# Patient Record
Sex: FEMALE | Race: WHITE | NOT HISPANIC OR LATINO | Employment: OTHER | URBAN - METROPOLITAN AREA
[De-identification: names, ages, dates, MRNs, and addresses within clinical notes are randomized per-mention and may not be internally consistent; named-entity substitution may affect disease eponyms.]

---

## 2017-04-08 ENCOUNTER — CONVERSION ENCOUNTER (OUTPATIENT)
Dept: URGENT CARE | Facility: CLINIC | Age: 67
End: 2017-04-08

## 2017-04-17 ENCOUNTER — CONVERSION ENCOUNTER (OUTPATIENT)
Dept: URGENT CARE | Facility: CLINIC | Age: 67
End: 2017-04-17

## 2017-04-27 ENCOUNTER — HOSPITAL ENCOUNTER (OUTPATIENT)
Dept: CT IMAGING | Facility: HOSPITAL | Age: 67
Discharge: HOME | End: 2017-04-27
Attending: INTERNAL MEDICINE | Admitting: INTERNAL MEDICINE

## 2017-04-27 LAB — CREAT BLDA-MCNC: 0.5 MG/DL (ref 0.6–1.3)

## 2019-06-04 VITALS
DIASTOLIC BLOOD PRESSURE: 98 MMHG | OXYGEN SATURATION: 92 % | DIASTOLIC BLOOD PRESSURE: 89 MMHG | WEIGHT: 125.8 LBS | SYSTOLIC BLOOD PRESSURE: 142 MMHG | HEART RATE: 97 BPM | HEIGHT: 64 IN | BODY MASS INDEX: 21.34 KG/M2 | SYSTOLIC BLOOD PRESSURE: 146 MMHG | WEIGHT: 125 LBS | BODY MASS INDEX: 21.59 KG/M2 | RESPIRATION RATE: 28 BRPM | RESPIRATION RATE: 24 BRPM | HEART RATE: 78 BPM | OXYGEN SATURATION: 92 %

## 2021-06-08 ENCOUNTER — APPOINTMENT (OUTPATIENT)
Dept: OTHER | Facility: HOSPITAL | Age: 71
End: 2021-06-08

## 2021-06-08 ENCOUNTER — APPOINTMENT (OUTPATIENT)
Dept: CT IMAGING | Facility: HOSPITAL | Age: 71
End: 2021-06-08

## 2021-06-08 ENCOUNTER — HOSPITAL ENCOUNTER (EMERGENCY)
Facility: HOSPITAL | Age: 71
Discharge: HOME | End: 2021-06-08
Admitting: EMERGENCY MEDICINE

## 2021-06-08 VITALS
SYSTOLIC BLOOD PRESSURE: 145 MMHG | DIASTOLIC BLOOD PRESSURE: 78 MMHG | WEIGHT: 124.78 LBS | RESPIRATION RATE: 18 BRPM | TEMPERATURE: 98 F | BODY MASS INDEX: 21.3 KG/M2 | HEIGHT: 64 IN | OXYGEN SATURATION: 96 % | HEART RATE: 93 BPM

## 2021-06-08 DIAGNOSIS — Z09 FOLLOW UP: ICD-10-CM

## 2021-06-08 DIAGNOSIS — R05.9 COUGH: Primary | ICD-10-CM

## 2021-06-08 DIAGNOSIS — R93.89 ABNORMAL CT SCAN: ICD-10-CM

## 2021-06-08 LAB
ANION GAP SERPL CALCULATED.3IONS-SCNC: 12 MMOL/L (ref 5–15)
B PARAPERT DNA SPEC QL NAA+PROBE: NOT DETECTED
B PERT DNA SPEC QL NAA+PROBE: NOT DETECTED
BASOPHILS # BLD AUTO: 0 10*3/MM3 (ref 0–0.2)
BASOPHILS NFR BLD AUTO: 0.6 % (ref 0–1.5)
BUN SERPL-MCNC: 10 MG/DL (ref 8–23)
BUN/CREAT SERPL: 18.5 (ref 7–25)
C PNEUM DNA NPH QL NAA+NON-PROBE: NOT DETECTED
CALCIUM SPEC-SCNC: 9.8 MG/DL (ref 8.6–10.5)
CHLORIDE SERPL-SCNC: 102 MMOL/L (ref 98–107)
CO2 SERPL-SCNC: 25 MMOL/L (ref 22–29)
CREAT SERPL-MCNC: 0.54 MG/DL (ref 0.57–1)
DEPRECATED RDW RBC AUTO: 45.1 FL (ref 37–54)
EOSINOPHIL # BLD AUTO: 0 10*3/MM3 (ref 0–0.4)
EOSINOPHIL NFR BLD AUTO: 0.5 % (ref 0.3–6.2)
ERYTHROCYTE [DISTWIDTH] IN BLOOD BY AUTOMATED COUNT: 13.9 % (ref 12.3–15.4)
FLUAV SUBTYP SPEC NAA+PROBE: NOT DETECTED
FLUBV RNA ISLT QL NAA+PROBE: NOT DETECTED
GFR SERPL CREATININE-BSD FRML MDRD: 111 ML/MIN/1.73
GLUCOSE SERPL-MCNC: 102 MG/DL (ref 65–99)
HADV DNA SPEC NAA+PROBE: NOT DETECTED
HCOV 229E RNA SPEC QL NAA+PROBE: NOT DETECTED
HCOV HKU1 RNA SPEC QL NAA+PROBE: NOT DETECTED
HCOV NL63 RNA SPEC QL NAA+PROBE: NOT DETECTED
HCOV OC43 RNA SPEC QL NAA+PROBE: NOT DETECTED
HCT VFR BLD AUTO: 42.3 % (ref 34–46.6)
HGB BLD-MCNC: 14 G/DL (ref 12–15.9)
HMPV RNA NPH QL NAA+NON-PROBE: NOT DETECTED
HPIV1 RNA SPEC QL NAA+PROBE: NOT DETECTED
HPIV2 RNA SPEC QL NAA+PROBE: NOT DETECTED
HPIV3 RNA NPH QL NAA+PROBE: NOT DETECTED
HPIV4 P GENE NPH QL NAA+PROBE: NOT DETECTED
LYMPHOCYTES # BLD AUTO: 1.3 10*3/MM3 (ref 0.7–3.1)
LYMPHOCYTES NFR BLD AUTO: 21.2 % (ref 19.6–45.3)
M PNEUMO IGG SER IA-ACNC: NOT DETECTED
MCH RBC QN AUTO: 30.9 PG (ref 26.6–33)
MCHC RBC AUTO-ENTMCNC: 33.2 G/DL (ref 31.5–35.7)
MCV RBC AUTO: 93.3 FL (ref 79–97)
MONOCYTES # BLD AUTO: 0.4 10*3/MM3 (ref 0.1–0.9)
MONOCYTES NFR BLD AUTO: 5.9 % (ref 5–12)
NEUTROPHILS NFR BLD AUTO: 4.4 10*3/MM3 (ref 1.7–7)
NEUTROPHILS NFR BLD AUTO: 71.8 % (ref 42.7–76)
NRBC BLD AUTO-RTO: 0 /100 WBC (ref 0–0.2)
PLATELET # BLD AUTO: 179 10*3/MM3 (ref 140–450)
PMV BLD AUTO: 8.6 FL (ref 6–12)
POTASSIUM SERPL-SCNC: 4.3 MMOL/L (ref 3.5–5.2)
RBC # BLD AUTO: 4.53 10*6/MM3 (ref 3.77–5.28)
RHINOVIRUS RNA SPEC NAA+PROBE: NOT DETECTED
RSV RNA NPH QL NAA+NON-PROBE: NOT DETECTED
SARS-COV-2 RNA NPH QL NAA+NON-PROBE: NOT DETECTED
SODIUM SERPL-SCNC: 139 MMOL/L (ref 136–145)
WBC # BLD AUTO: 6.2 10*3/MM3 (ref 3.4–10.8)

## 2021-06-08 PROCEDURE — 85025 COMPLETE CBC W/AUTO DIFF WBC: CPT | Performed by: NURSE PRACTITIONER

## 2021-06-08 PROCEDURE — 74177 CT ABD & PELVIS W/CONTRAST: CPT

## 2021-06-08 PROCEDURE — 0 IOPAMIDOL PER 1 ML: Performed by: NURSE PRACTITIONER

## 2021-06-08 PROCEDURE — 0202U NFCT DS 22 TRGT SARS-COV-2: CPT | Performed by: NURSE PRACTITIONER

## 2021-06-08 PROCEDURE — 99283 EMERGENCY DEPT VISIT LOW MDM: CPT

## 2021-06-08 PROCEDURE — 71260 CT THORAX DX C+: CPT

## 2021-06-08 PROCEDURE — 80048 BASIC METABOLIC PNL TOTAL CA: CPT | Performed by: NURSE PRACTITIONER

## 2021-06-08 RX ORDER — MELATONIN
1000 DAILY
COMMUNITY

## 2021-06-08 RX ORDER — AMOXICILLIN AND CLAVULANATE POTASSIUM 875; 125 MG/1; MG/1
1 TABLET, FILM COATED ORAL EVERY 12 HOURS
COMMUNITY
Start: 2021-06-07 | End: 2021-06-17

## 2021-06-08 RX ORDER — ALBUTEROL SULFATE 90 UG/1
2 AEROSOL, METERED RESPIRATORY (INHALATION)
COMMUNITY
Start: 2017-04-08

## 2021-06-08 RX ORDER — ASCORBIC ACID 250 MG
250 TABLET ORAL DAILY
COMMUNITY

## 2021-06-08 RX ORDER — LANOLIN ALCOHOL/MO/W.PET/CERES
CREAM (GRAM) TOPICAL
COMMUNITY

## 2021-06-08 RX ORDER — ASPIRIN 81 MG/1
TABLET ORAL
COMMUNITY
Start: 2017-04-08

## 2021-06-08 RX ORDER — SODIUM CHLORIDE 0.9 % (FLUSH) 0.9 %
10 SYRINGE (ML) INJECTION AS NEEDED
Status: DISCONTINUED | OUTPATIENT
Start: 2021-06-08 | End: 2021-06-08 | Stop reason: HOSPADM

## 2021-06-08 RX ADMIN — IOPAMIDOL 100 ML: 755 INJECTION, SOLUTION INTRAVENOUS at 12:24

## 2021-06-08 RX ADMIN — SODIUM CHLORIDE 1000 ML: 9 INJECTION, SOLUTION INTRAVENOUS at 10:39

## 2021-06-08 NOTE — ED PROVIDER NOTES
Subjective   Chief complaint:  Cough congestion abnormal x-ray    Context: Patient is a 71-year-old female who comes in ambuLatory by private vehicle with complaints of cough and congestion with an abnormal x-ray yesterday at urgent care.  has had a productive cough and states she has had some improvement after being seen in urgent care and receiving a prescription for albuterol and Augmentin.   She states she has had symptoms for the last 3 days with some occasional night sweats.  She denies any history of IVDA TB exposure or unexplained weight loss.  She states she was around ill contacts with upper respiratory symptoms last week.  She states she is fully vaccinated against Covid.  She went to urgent care yesterday and was told that she has an abnormal chest x-ray and needed to be evaluated for lung cancer.  She is a longtime smoker.  She states she has never had a colonoscopy.  She states she does get routine mammograms as her mom and sister have both had breast cancer.  She denies any swelling in her legs or feet recent trauma surgery immobilization prior history of DVT or PE.  She denies any chest pain or shortness of breath.   She has a family doctor but does not receive routine cancer screenings.  She states she was instructed to follow-up with her family doctor for outpatient imaging to evaluate for cancer for her suspicious CT states Her doctor was unable to give her an outpatient order for CT and she will be out of town next week so she came to the ER to be evaluated.    Duration: 3 days    Timing: Waxes and wanes     Severity: Minor    Associated symptoms: Better after Augmentin and albuterol          PCP:  red          Review of Systems   Constitutional: Negative for fever.        Night sweats     HENT: Positive for congestion.    Eyes: Negative for visual disturbance.   Respiratory: Positive for cough.    Cardiovascular: Negative.    Gastrointestinal: Negative.    Genitourinary: Negative.     Musculoskeletal: Negative.    Skin: Negative.    Allergic/Immunologic: Negative for immunocompromised state.   Neurological: Negative.    Hematological: Does not bruise/bleed easily.   Psychiatric/Behavioral: Negative for confusion.       Past Medical History:   Diagnosis Date   • Angina at rest (CMS/HCC)        Allergies   Allergen Reactions   • Codeine Hives   • Levofloxacin Myalgia       Past Surgical History:   Procedure Laterality Date   • HERNIA REPAIR         History reviewed. No pertinent family history.    Social History     Socioeconomic History   • Marital status: Significant Other     Spouse name: Not on file   • Number of children: Not on file   • Years of education: Not on file   • Highest education level: Not on file   Tobacco Use   • Smoking status: Current Some Day Smoker   Substance and Sexual Activity   • Alcohol use: Never   • Drug use: Never   • Sexual activity: Defer           Objective   Physical Exam     Vital signs and triage nurse note reviewed.   Constitutional: Awake, alert; well-developed and well-nourished. No acute distress is noted.   HEENT: Normocephalic, atraumatic; pupils are PERRL with intact EOM; oropharynx is pink and moist without exudate or erythema. Nasal congestion without tenderness over the frontal maxillary sinuses  Neck: Supple, full range of motion without pain; no cervical lymphadenopathy.   Cardiovascular: Regular rate and rhythm, normal S1-S2.   Pulmonary: Respiratory effort regular nonlabored, breath sounds diminished bilateral lower lobes   Abdomen: Soft, nontender nondistended with normoactive bowel sounds; no rebound or guarding.   Musculoskeletal: Independent range of motion of all extremities with no palpable tenderness or edema.   Neuro: Alert oriented x3, speech is clear and appropriate, GCS 15   Skin:  Fleshtone warm, dry, intact; no erythematous or petechial rash or lesion       Procedures           ED Course  ED Course as of Jun 08 1505   Tue Jun 08,  2021   1026 Bp 122/71 on initial eval    [JW]   1137 Waiting on pt to go to ct      [JW]   1354 Waiting on CT abdomen to be read    [JW]      ED Course User Index  [JW] Florence Fernando, POOJA                 Labs Reviewed   BASIC METABOLIC PANEL - Abnormal; Notable for the following components:       Result Value    Glucose 102 (*)     Creatinine 0.54 (*)     All other components within normal limits    Narrative:     GFR Normal >60  Chronic Kidney Disease <60  Kidney Failure <15     RESPIRATORY PANEL PCR W/ COVID-19 (SARS-COV-2) NEIL/BECK/SAMUEL/PAD/COR/MAD/[facility] IN-HOUSE, NP SWAB IN UTM/VTP, 3-4 HR TAT - Normal    Narrative:     In the setting of a positive respiratory panel with a viral infection PLUS a negative procalcitonin without other underlying concern for bacterial infection, consider observing off antibiotics or discontinuation of antibiotics and continue supportive care. If the respiratory panel is positive for atypical bacterial infection (Bordetella pertussis, Chlamydophila pneumoniae, or Mycoplasma pneumoniae), consider antibiotic de-escalation to target atypical bacterial infection.   CBC WITH AUTO DIFFERENTIAL - Normal   CBC AND DIFFERENTIAL    Narrative:     The following orders were created for panel order CBC & Differential.  Procedure                               Abnormality         Status                     ---------                               -----------         ------                     CBC Auto Differential[[id number]]        Normal              Final result                 Please view results for these tests on the individual orders.     Medications   sodium chloride 0.9 % flush 10 mL (has no administration in time range)   sodium chloride 0.9 % bolus 1,000 mL (0 mL Intravenous Stopped 6/8/21 1238)   iopamidol (ISOVUE-370) 76 % injection 100 mL (100 mL Intravenous Given 6/8/21 1224)     CT Chest With Contrast Diagnostic    Result Date: 6/8/2021   1. Right hilar mass with contiguous extension  into the mediastinum consistent with malignancy. Bulky adenopathy is seen within the mediastinum as described above. 2. Scattered Tribbett nodular densities in the right lower lobe, right middle lobe and lingula suggesting small airways infectious/inflammatory process. Report 3 no convincing evidence of metastatic disease in the included upper abdomen. Dedicated CT abdomen and pelvis with contrast or PET/CT is recommended for further evaluation or staging purposes. 3. Emphysema.    Electronically Signed By-Dona Suarez MD On:6/8/2021 12:54 PM This report was finalized on [id number] by  Dona Suarez MD.    CT Abdomen Pelvis With Contrast    Result Date: 6/8/2021   1. No acute findings in the abdomen or pelvis. 2. No evidence of primary malignancy or metastatic disease in the abdomen or pelvis. 3. CT chest performed on this same date has been reported separately.    Electronically Signed By-Dona Suarez MD On:6/8/2021 2:57 PM This report was finalized on [id number] by  Dona Suarez MD.    Prior to Admission medications    Medication Sig Start Date End Date Taking? Authorizing Provider   albuterol sulfate  (90 Base) MCG/ACT inhaler Inhale 2 puffs. 4/8/17  Yes Stepan Calderón MD   amoxicillin-clavulanate (AUGMENTIN) 875-125 MG per tablet Take 1 tablet by mouth Every 12 (Twelve) Hours. 6/7/21 6/17/21 Yes Stepan Calderón MD   aspirin (Adult Aspirin EC Low Strength) 81 MG EC tablet ADULT ASPIRIN EC LOW STRENGTH 81 MG ORAL TABLET DELAYED RELEASE 4/8/17  Yes Stepan Calderón MD   ascorbic acid (VITAMIN C) 250 MG tablet Take 250 mg by mouth Daily.    Stepan Calderón MD   Biotin 300 MCG tablet Take  by mouth.    Stepan Calderón MD   cholecalciferol (Cholecalciferol) 25 MCG (1000 UT) tablet Take 1,000 Units by mouth Daily.    Stepan Calderón MD                                MDM  Number of Diagnoses or Management Options  Abnormal CT scan  Cough  Diagnosis management  comments:      Comorbidities:  has a past medical history of Angina at rest (CMS/HCC).  Differentials: Cancer adenopathy viral illness not all inclusive of differentials considered  Radiology interpretation:  X-rays reviewed by me and interpreted by radiologist,   CT Chest With Contrast Diagnostic    Result Date: 6/8/2021   1. Right hilar mass with contiguous extension into the mediastinum consistent with malignancy. Bulky adenopathy is seen within the mediastinum as described above. 2. Scattered Tribbett nodular densities in the right lower lobe, right middle lobe and lingula suggesting small airways infectious/inflammatory process. Report 3 no convincing evidence of metastatic disease in the included upper abdomen. Dedicated CT abdomen and pelvis with contrast or PET/CT is recommended for further evaluation or staging purposes. 3. Emphysema.    Electronically Signed By-Dona Suarez MD On:6/8/2021 12:54 PM This report was finalized on [id number] by  Dona Suarez MD.    CT Abdomen Pelvis With Contrast    Result Date: 6/8/2021   1. No acute findings in the abdomen or pelvis. 2. No evidence of primary malignancy or metastatic disease in the abdomen or pelvis. 3. CT chest performed on this same date has been reported separately.    Electronically Signed By-Dona Suarez MD On:6/8/2021 2:57 PM This report was finalized on [id number] by  Dona Suarez MD.    Lab interpretation:  Labs viewed by me significant for, glucose 102    Appropriate PPE worn during exam.  Patient's blood pressure on initial evaluation was 122/71 and her triage blood pressure was felt to be erroneous  Was offered admission which she refused.  We discussed that her CT looks strongly concerning for malignancy and she verbalized understanding states she will follow-up.  Glucose 102    i discussed findings with patient who voices understanding of discharge instructions, signs and symptoms requiring return to ED; discharged improved and in  stable condition with follow up for re-evaluation.             Final diagnoses:   Cough   Abnormal CT scan       ED Disposition  ED Disposition     ED Disposition Condition Comment    Discharge Stable           Debbie Patterson MD  [address] [address]  [phone]    Schedule an appointment as soon as possible for a visit            Medication List      No changes were made to your prescriptions during this visit.          Florence Fernando, APRN  06/08/21 1502

## 2021-06-08 NOTE — ED NOTES
Seen at [facility] yesterday due to cough. Had CXR done and they found an area right lung. She came here for CT scan     Rosy Turpin RN  06/08/21 1027

## 2021-06-28 ENCOUNTER — PREP FOR SURGERY (OUTPATIENT)
Dept: OTHER | Facility: HOSPITAL | Age: 71
End: 2021-06-28

## 2021-06-28 ENCOUNTER — OFFICE VISIT (OUTPATIENT)
Dept: PULMONOLOGY | Facility: HOSPITAL | Age: 71
End: 2021-06-28

## 2021-06-28 VITALS
WEIGHT: 124 LBS | HEART RATE: 96 BPM | HEIGHT: 64 IN | SYSTOLIC BLOOD PRESSURE: 142 MMHG | RESPIRATION RATE: 13 BRPM | BODY MASS INDEX: 21.17 KG/M2 | TEMPERATURE: 98.4 F | DIASTOLIC BLOOD PRESSURE: 68 MMHG | OXYGEN SATURATION: 94 %

## 2021-06-28 DIAGNOSIS — R91.8 LUNG MASS: Primary | ICD-10-CM

## 2021-06-28 PROCEDURE — G0463 HOSPITAL OUTPT CLINIC VISIT: HCPCS

## 2021-06-28 RX ORDER — ISOSORBIDE MONONITRATE 60 MG/1
60 TABLET, EXTENDED RELEASE ORAL EVERY MORNING
COMMUNITY
Start: 2021-06-17

## 2021-06-28 NOTE — PROGRESS NOTES
PULMONARY/ CRITICAL CARE/ SLEEP MEDICINE OUTPATIENT CONSULT/ FOLLOW UP NOTE        Patient Name:  Liz Perla    :  1950    Medical Record:  [id number]    PRIMARY CARE PHYSICIAN     Debbie Patterson MD    REASON FOR CONSULTATION    Liz Perla is a 71 y.o. female who is referred for consultation for lung mass  REVIEW OF SYSTEMS    Constitutional:  Denies fever or chills   Eyes:  Denies change in visual acuity   HENT:  Denies nasal congestion or sore throat   Respiratory:  Denies cough or shortness of breath   Cardiovascular:  Denies chest pain or edema   GI:  Denies abdominal pain, nausea, vomiting, bloody stools or diarrhea   :  Denies dysuria   Musculoskeletal:  Denies back pain or joint pain   Integument:  Denies rash   Neurologic:  Denies headache, focal weakness or sensory changes   Endocrine:  Denies polyuria or polydipsia   Lymphatic:  Denies swollen glands   Psychiatric:  Denies depression or anxiety     MEDICAL HISTORY    Past Medical History:   Diagnosis Date   • Hilar mass 2020    Right   • HTN (hypertension) 2016        SURGICAL HISTORY    Past Surgical History:   Procedure Laterality Date   • HERNIA REPAIR     • KNEE CARTILAGE SURGERY  2016        FAMILY HISTORY    Family History   Problem Relation Age of Onset   • Allergies Mother    • Asthma Father    • Allergies Sister        SOCIAL HISTORY    Social History     Tobacco Use   • Smoking status: Former Smoker     Types: Cigarettes     Quit date: 2019     Years since quittin.4   • Smokeless tobacco: Never Used   Substance Use Topics   • Alcohol use: Never        ALLERGIES    Allergies   Allergen Reactions   • Cinnamon Anaphylaxis   • Codeine Hives   • Garlic Anaphylaxis   • Levofloxacin Myalgia   • Pistachio Nut Anaphylaxis   • Lidocaine Nausea And Vomiting         MEDICATIONS    Current Outpatient Medications on File Prior to Visit   Medication Sig Dispense Refill   • albuterol sulfate  (90 Base) MCG/ACT inhaler  "Inhale 2 puffs.     • ascorbic acid (VITAMIN C) 250 MG tablet Take 250 mg by mouth Daily.     • Biotin 300 MCG tablet Take  by mouth.     • cholecalciferol (Cholecalciferol) 25 MCG (1000 UT) tablet Take 1,000 Units by mouth Daily.     • isosorbide mononitrate (IMDUR) 60 MG 24 hr tablet Take 60 mg by mouth Every Morning.     • aspirin (Adult Aspirin EC Low Strength) 81 MG EC tablet ADULT ASPIRIN EC LOW STRENGTH 81 MG ORAL TABLET DELAYED RELEASE       No current facility-administered medications on file prior to visit.       PHYSICAL EXAM    Vitals:    06/28/21 1442   BP: 142/68   BP Location: Left arm   Patient Position: Sitting   Cuff Size: Adult   Pulse: 96   Resp: 13   Temp: 98.4 °F (36.9 °C)   TempSrc: Oral   SpO2: 94%   Weight: 56.2 kg (124 lb)   Height: 162.6 cm (64\")        Constitutional:  Well developed, well nourished, no acute distress, non-toxic appearance   Eyes:  PERRL, conjunctiva normal   HENT:  Atraumatic, external ears normal, nose normal, oropharynx moist, no pharyngeal exudates. mallampatti   Neck- normal range of motion, no tenderness, supple   Respiratory:  No respiratory distress, normal breath sounds, no rales, no wheezing   Cardiovascular:  Normal rate, normal rhythm, no murmurs, no gallops, no rubs   GI:  Soft, nondistended, normal bowel sounds, nontender, no organomegaly, no mass, no rebound, no guarding   :  No costovertebral angle tenderness   Musculoskeletal:  No edema, no tenderness, no deformities. Back- no tenderness  Integument:  Well hydrated, no rash   Lymphatic:  No lymphadenopathy noted   Neurologic:  Alert & oriented x 3, CN 2-12 normal, normal motor function, normal sensory function, no focal deficits noted   Psychiatric:  Speech and behavior appropriate     CT Chest With Contrast Diagnostic    Result Date: 6/8/2021   1. Right hilar mass with contiguous extension into the mediastinum consistent with malignancy. Bulky adenopathy is seen within the mediastinum as described " above. 2. Scattered Tribbett nodular densities in the right lower lobe, right middle lobe and lingula suggesting small airways infectious/inflammatory process. Report 3 no convincing evidence of metastatic disease in the included upper abdomen. Dedicated CT abdomen and pelvis with contrast or PET/CT is recommended for further evaluation or staging purposes. 3. Emphysema.    Electronically Signed By-Dona Suarez MD On:6/8/2021 12:54 PM This report was finalized on [id number] by  Dona Suarez MD.    CT Abdomen Pelvis With Contrast    Result Date: 6/8/2021   1. No acute findings in the abdomen or pelvis. 2. No evidence of primary malignancy or metastatic disease in the abdomen or pelvis. 3. CT chest performed on this same date has been reported separately.    Electronically Signed By-Dona Suarez MD On:6/8/2021 2:57 PM This report was finalized on [id number] by  Dona Suarez MD.         ASSESSMENT & PLAN:        71-year-old female quit smoking recently with history of 40 to 50 pack years smoking PET scan done on 6/18/2021   right hilar mass on image 54 measuring approximately 3.2 x 2.4 cm with SUV max 10.1, suggesting primary lung malignancy. Scattered tiny tree-in-bud nodules within the right middle, right lower, and left upper lobe lingular regions likely represents infectious or inflammatory changes. There is FDG avid mediastinal lymphadenopathy compatible with metastasis. An index pretracheal lymph node on image 47 measures 3.3 x 2.7 cm with SUV max 10.3.      Plan  EBUS bronchoscopy on 7/2/2021            This document has been electronically signed by  Wellington Branham MD  15:09 EDT

## 2021-06-30 ENCOUNTER — LAB (OUTPATIENT)
Dept: LAB | Facility: HOSPITAL | Age: 71
End: 2021-06-30

## 2021-06-30 ENCOUNTER — HOSPITAL ENCOUNTER (OUTPATIENT)
Dept: CARDIOLOGY | Facility: HOSPITAL | Age: 71
Discharge: HOME | End: 2021-06-30

## 2021-06-30 LAB
ANION GAP SERPL CALCULATED.3IONS-SCNC: 8.6 MMOL/L (ref 5–15)
BUN SERPL-MCNC: 7 MG/DL (ref 8–23)
BUN/CREAT SERPL: 12.5 (ref 7–25)
CALCIUM SPEC-SCNC: 8.8 MG/DL (ref 8.6–10.5)
CHLORIDE SERPL-SCNC: 104 MMOL/L (ref 98–107)
CO2 SERPL-SCNC: 27.4 MMOL/L (ref 22–29)
CREAT SERPL-MCNC: 0.56 MG/DL (ref 0.57–1)
DEPRECATED RDW RBC AUTO: 43.7 FL (ref 37–54)
ERYTHROCYTE [DISTWIDTH] IN BLOOD BY AUTOMATED COUNT: 12.5 % (ref 12.3–15.4)
GFR SERPL CREATININE-BSD FRML MDRD: 107 ML/MIN/1.73
GLUCOSE SERPL-MCNC: 94 MG/DL (ref 65–99)
HCT VFR BLD AUTO: 42.9 % (ref 34–46.6)
HGB BLD-MCNC: 14 G/DL (ref 12–15.9)
MCH RBC QN AUTO: 30.7 PG (ref 26.6–33)
MCHC RBC AUTO-ENTMCNC: 32.6 G/DL (ref 31.5–35.7)
MCV RBC AUTO: 94.1 FL (ref 79–97)
PLATELET # BLD AUTO: 213 10*3/MM3 (ref 140–450)
PMV BLD AUTO: 11.1 FL (ref 6–12)
POTASSIUM SERPL-SCNC: 4.2 MMOL/L (ref 3.5–5.2)
RBC # BLD AUTO: 4.56 10*6/MM3 (ref 3.77–5.28)
SARS-COV-2 ORF1AB RESP QL NAA+PROBE: NOT DETECTED
SODIUM SERPL-SCNC: 140 MMOL/L (ref 136–145)
WBC # BLD AUTO: 5 10*3/MM3 (ref 3.4–10.8)

## 2021-06-30 PROCEDURE — U0005 INFEC AGEN DETEC AMPLI PROBE: HCPCS

## 2021-06-30 PROCEDURE — U0004 COV-19 TEST NON-CDC HGH THRU: HCPCS

## 2021-06-30 PROCEDURE — 36415 COLL VENOUS BLD VENIPUNCTURE: CPT | Performed by: INTERNAL MEDICINE

## 2021-06-30 PROCEDURE — C9803 HOPD COVID-19 SPEC COLLECT: HCPCS

## 2021-06-30 PROCEDURE — 93010 ELECTROCARDIOGRAM REPORT: CPT | Performed by: INTERNAL MEDICINE

## 2021-06-30 PROCEDURE — 80048 BASIC METABOLIC PNL TOTAL CA: CPT | Performed by: INTERNAL MEDICINE

## 2021-06-30 PROCEDURE — 93005 ELECTROCARDIOGRAM TRACING: CPT

## 2021-06-30 PROCEDURE — 85027 COMPLETE CBC AUTOMATED: CPT | Performed by: INTERNAL MEDICINE

## 2021-07-02 ENCOUNTER — ANESTHESIA EVENT (OUTPATIENT)
Dept: GASTROENTEROLOGY | Facility: HOSPITAL | Age: 71
End: 2021-07-02

## 2021-07-02 ENCOUNTER — HOSPITAL ENCOUNTER (OUTPATIENT)
Facility: HOSPITAL | Age: 71
Setting detail: HOSPITAL OUTPATIENT SURGERY
Discharge: HOME | End: 2021-07-02
Attending: INTERNAL MEDICINE | Admitting: INTERNAL MEDICINE

## 2021-07-02 ENCOUNTER — ANESTHESIA (OUTPATIENT)
Dept: GASTROENTEROLOGY | Facility: HOSPITAL | Age: 71
End: 2021-07-02

## 2021-07-02 VITALS
HEART RATE: 111 BPM | OXYGEN SATURATION: 90 % | SYSTOLIC BLOOD PRESSURE: 111 MMHG | DIASTOLIC BLOOD PRESSURE: 62 MMHG | BODY MASS INDEX: 21.17 KG/M2 | WEIGHT: 124 LBS | RESPIRATION RATE: 17 BRPM | HEIGHT: 64 IN | TEMPERATURE: 98 F

## 2021-07-02 DIAGNOSIS — R91.8 LUNG MASS: ICD-10-CM

## 2021-07-02 LAB
B PARAPERT DNA SPEC QL NAA+PROBE: NOT DETECTED
B PERT DNA SPEC QL NAA+PROBE: NOT DETECTED
C PNEUM DNA NPH QL NAA+NON-PROBE: NOT DETECTED
FLUAV SUBTYP SPEC NAA+PROBE: NOT DETECTED
FLUBV RNA ISLT QL NAA+PROBE: NOT DETECTED
HADV DNA SPEC NAA+PROBE: NOT DETECTED
HCOV 229E RNA SPEC QL NAA+PROBE: NOT DETECTED
HCOV HKU1 RNA SPEC QL NAA+PROBE: NOT DETECTED
HCOV NL63 RNA SPEC QL NAA+PROBE: NOT DETECTED
HCOV OC43 RNA SPEC QL NAA+PROBE: NOT DETECTED
HMPV RNA NPH QL NAA+NON-PROBE: NOT DETECTED
HPIV1 RNA SPEC QL NAA+PROBE: NOT DETECTED
HPIV2 RNA SPEC QL NAA+PROBE: NOT DETECTED
HPIV3 RNA NPH QL NAA+PROBE: NOT DETECTED
HPIV4 P GENE NPH QL NAA+PROBE: NOT DETECTED
M PNEUMO IGG SER IA-ACNC: NOT DETECTED
RHINOVIRUS RNA SPEC NAA+PROBE: NOT DETECTED
RSV RNA NPH QL NAA+NON-PROBE: NOT DETECTED

## 2021-07-02 PROCEDURE — 87102 FUNGUS ISOLATION CULTURE: CPT | Performed by: INTERNAL MEDICINE

## 2021-07-02 PROCEDURE — 25010000002 DEXAMETHASONE PER 1 MG: Performed by: ANESTHESIOLOGY

## 2021-07-02 PROCEDURE — 87252 VIRUS INOCULATION TISSUE: CPT | Performed by: INTERNAL MEDICINE

## 2021-07-02 PROCEDURE — 87071 CULTURE AEROBIC QUANT OTHER: CPT | Performed by: INTERNAL MEDICINE

## 2021-07-02 PROCEDURE — 88172 CYTP DX EVAL FNA 1ST EA SITE: CPT | Performed by: INTERNAL MEDICINE

## 2021-07-02 PROCEDURE — 25010000002 PROPOFOL 200 MG/20ML EMULSION: Performed by: ANESTHESIOLOGY

## 2021-07-02 PROCEDURE — 94640 AIRWAY INHALATION TREATMENT: CPT

## 2021-07-02 PROCEDURE — 94799 UNLISTED PULMONARY SVC/PX: CPT

## 2021-07-02 PROCEDURE — 87116 MYCOBACTERIA CULTURE: CPT | Performed by: INTERNAL MEDICINE

## 2021-07-02 PROCEDURE — 87633 RESP VIRUS 12-25 TARGETS: CPT | Performed by: INTERNAL MEDICINE

## 2021-07-02 PROCEDURE — 87206 SMEAR FLUORESCENT/ACID STAI: CPT | Performed by: INTERNAL MEDICINE

## 2021-07-02 PROCEDURE — 87496 CYTOMEG DNA AMP PROBE: CPT | Performed by: INTERNAL MEDICINE

## 2021-07-02 PROCEDURE — 88108 CYTOPATH CONCENTRATE TECH: CPT | Performed by: INTERNAL MEDICINE

## 2021-07-02 PROCEDURE — 25010000002 ONDANSETRON PER 1 MG: Performed by: ANESTHESIOLOGY

## 2021-07-02 PROCEDURE — 25010000002 FENTANYL CITRATE (PF) 50 MCG/ML SOLUTION: Performed by: ANESTHESIOLOGY

## 2021-07-02 PROCEDURE — 88177 CYTP FNA EVAL EA ADDL: CPT | Performed by: INTERNAL MEDICINE

## 2021-07-02 PROCEDURE — 87205 SMEAR GRAM STAIN: CPT | Performed by: INTERNAL MEDICINE

## 2021-07-02 PROCEDURE — 88341 IMHCHEM/IMCYTCHM EA ADD ANTB: CPT | Performed by: INTERNAL MEDICINE

## 2021-07-02 PROCEDURE — C1726 CATH, BAL DIL, NON-VASCULAR: HCPCS | Performed by: INTERNAL MEDICINE

## 2021-07-02 PROCEDURE — 87798 DETECT AGENT NOS DNA AMP: CPT | Performed by: INTERNAL MEDICINE

## 2021-07-02 PROCEDURE — 88305 TISSUE EXAM BY PATHOLOGIST: CPT | Performed by: INTERNAL MEDICINE

## 2021-07-02 PROCEDURE — 88342 IMHCHEM/IMCYTCHM 1ST ANTB: CPT | Performed by: INTERNAL MEDICINE

## 2021-07-02 RX ORDER — MAGNESIUM HYDROXIDE 1200 MG/15ML
LIQUID ORAL
Status: DISCONTINUED
Start: 2021-07-02 | End: 2021-07-02 | Stop reason: HOSPADM

## 2021-07-02 RX ORDER — SODIUM CHLORIDE 9 MG/ML
INJECTION, SOLUTION INTRAVENOUS CONTINUOUS PRN
Status: DISCONTINUED | OUTPATIENT
Start: 2021-07-02 | End: 2021-07-02 | Stop reason: SURG

## 2021-07-02 RX ORDER — PHENYLEPHRINE HCL IN 0.9% NACL 1 MG/10 ML
SYRINGE (ML) INTRAVENOUS AS NEEDED
Status: DISCONTINUED | OUTPATIENT
Start: 2021-07-02 | End: 2021-07-02 | Stop reason: SURG

## 2021-07-02 RX ORDER — MEPERIDINE HYDROCHLORIDE 25 MG/ML
12.5 INJECTION INTRAMUSCULAR; INTRAVENOUS; SUBCUTANEOUS
Status: DISCONTINUED | OUTPATIENT
Start: 2021-07-02 | End: 2021-07-02 | Stop reason: HOSPADM

## 2021-07-02 RX ORDER — LIDOCAINE 50 MG/G
OINTMENT TOPICAL AS NEEDED
Status: DISCONTINUED | OUTPATIENT
Start: 2021-07-02 | End: 2021-07-02 | Stop reason: HOSPADM

## 2021-07-02 RX ORDER — PROPOFOL 10 MG/ML
INJECTION, EMULSION INTRAVENOUS AS NEEDED
Status: DISCONTINUED | OUTPATIENT
Start: 2021-07-02 | End: 2021-07-02 | Stop reason: SURG

## 2021-07-02 RX ORDER — DEXAMETHASONE SODIUM PHOSPHATE 4 MG/ML
8 INJECTION, SOLUTION INTRA-ARTICULAR; INTRALESIONAL; INTRAMUSCULAR; INTRAVENOUS; SOFT TISSUE ONCE AS NEEDED
Status: COMPLETED | OUTPATIENT
Start: 2021-07-02 | End: 2021-07-02

## 2021-07-02 RX ORDER — EPHEDRINE SULFATE 50 MG/ML
INJECTION INTRAVENOUS AS NEEDED
Status: DISCONTINUED | OUTPATIENT
Start: 2021-07-02 | End: 2021-07-02 | Stop reason: SURG

## 2021-07-02 RX ORDER — IPRATROPIUM BROMIDE AND ALBUTEROL SULFATE 2.5; .5 MG/3ML; MG/3ML
3 SOLUTION RESPIRATORY (INHALATION) ONCE AS NEEDED
Status: COMPLETED | OUTPATIENT
Start: 2021-07-02 | End: 2021-07-02

## 2021-07-02 RX ORDER — EPINEPHRINE 0.1 MG/ML
SYRINGE (ML) INJECTION
Status: DISCONTINUED
Start: 2021-07-02 | End: 2021-07-02 | Stop reason: WASHOUT

## 2021-07-02 RX ORDER — ROCURONIUM BROMIDE 10 MG/ML
INJECTION, SOLUTION INTRAVENOUS AS NEEDED
Status: DISCONTINUED | OUTPATIENT
Start: 2021-07-02 | End: 2021-07-02 | Stop reason: SURG

## 2021-07-02 RX ORDER — ONDANSETRON 2 MG/ML
4 INJECTION INTRAMUSCULAR; INTRAVENOUS ONCE AS NEEDED
Status: COMPLETED | OUTPATIENT
Start: 2021-07-02 | End: 2021-07-02

## 2021-07-02 RX ORDER — LIDOCAINE 50 MG/G
OINTMENT TOPICAL
Status: DISCONTINUED
Start: 2021-07-02 | End: 2021-07-02 | Stop reason: HOSPADM

## 2021-07-02 RX ORDER — FENTANYL CITRATE 50 UG/ML
50 INJECTION, SOLUTION INTRAMUSCULAR; INTRAVENOUS
Status: DISCONTINUED | OUTPATIENT
Start: 2021-07-02 | End: 2021-07-02 | Stop reason: HOSPADM

## 2021-07-02 RX ADMIN — PROPOFOL 100 MG: 10 INJECTION, EMULSION INTRAVENOUS at 11:39

## 2021-07-02 RX ADMIN — ONDANSETRON 4 MG: 2 INJECTION INTRAMUSCULAR; INTRAVENOUS at 12:06

## 2021-07-02 RX ADMIN — SUGAMMADEX 200 MG: 100 INJECTION, SOLUTION INTRAVENOUS at 12:18

## 2021-07-02 RX ADMIN — EPHEDRINE SULFATE 20 MG: 50 INJECTION INTRAVENOUS at 11:59

## 2021-07-02 RX ADMIN — ROCURONIUM BROMIDE 30 MG: 10 INJECTION INTRAVENOUS at 11:42

## 2021-07-02 RX ADMIN — FENTANYL CITRATE 50 MCG: 50 INJECTION, SOLUTION INTRAMUSCULAR; INTRAVENOUS at 11:36

## 2021-07-02 RX ADMIN — DEXAMETHASONE SODIUM PHOSPHATE 4 MG: 4 INJECTION, SOLUTION INTRAMUSCULAR; INTRAVENOUS at 12:06

## 2021-07-02 RX ADMIN — Medication 200 MCG: at 11:59

## 2021-07-02 RX ADMIN — SODIUM CHLORIDE: 0.9 INJECTION, SOLUTION INTRAVENOUS at 11:34

## 2021-07-02 RX ADMIN — PROPOFOL 50 MG: 10 INJECTION, EMULSION INTRAVENOUS at 11:47

## 2021-07-02 RX ADMIN — EPHEDRINE SULFATE 10 MG: 50 INJECTION INTRAVENOUS at 11:56

## 2021-07-02 RX ADMIN — Medication 100 MCG: at 11:48

## 2021-07-02 RX ADMIN — FENTANYL CITRATE 50 MCG: 50 INJECTION, SOLUTION INTRAMUSCULAR; INTRAVENOUS at 11:44

## 2021-07-02 RX ADMIN — IPRATROPIUM BROMIDE AND ALBUTEROL SULFATE 3 ML: 2.5; .5 SOLUTION RESPIRATORY (INHALATION) at 12:53

## 2021-07-02 NOTE — H&P
Patient Care Team:  Debbie Patterson MD as PCP - General    Chief complaint mediastinal adenopathy        Assessment/Plan     71-year-old female quit smoking recently with history of 40 to 50 pack years smoking PET scan done on 6/18/2021   right hilar mass on image 54 measuring approximately 3.2 x 2.4 cm with SUV max 10.1, suggesting primary lung malignancy. Scattered tiny tree-in-bud nodules within the right middle, right lower, and left upper lobe lingular regions likely represents infectious or inflammatory changes. There is FDG avid mediastinal lymphadenopathy compatible with metastasis. An index pretracheal lymph node on image 47 measures 3.3 x 2.7 cm with SUV max 10.3.        Plan  EBUS bronchoscopy on 7/2/2021                 History  71-year-old female quit smoking recently with history of 40 to 50 pack years smoking PET scan done on 6/18/2021   right hilar mass on image 54 measuring approximately 3.2 x 2.4 cm with SUV max 10.1, suggesting primary lung malignancy. Scattered tiny tree-in-bud nodules within the right middle, right lower, and left upper lobe lingular regions likely represents infectious or inflammatory changes. There is FDG avid mediastinal lymphadenopathy compatible with metastasis. An index pretracheal lymph node on image 47 measures 3.3 x 2.7 cm with SUV max 10.3.      Lung mass      Past Medical History:   Diagnosis Date   • Angina at rest (CMS/HCC)    • Hilar mass 02/11/2020    Right   • History of squamous cell carcinoma 2016    back of neck, side of face, removed with clean margins   • HTN (hypertension) 2016       Past Surgical History:   Procedure Laterality Date   • CATARACT EXTRACTION EXTRACAPSULAR W/ INTRAOCULAR LENS IMPLANTATION     • HERNIA REPAIR     • KNEE CARTILAGE SURGERY  2016       Family History   Problem Relation Age of Onset   • Allergies Mother    • Asthma Father    • Allergies Sister        Social History     Socioeconomic History   • Marital status: Significant Other      Spouse name: Not on file   • Number of children: Not on file   • Years of education: Not on file   • Highest education level: Not on file   Tobacco Use   • Smoking status: Former Smoker     Types: Cigarettes     Quit date: 2019     Years since quittin.5   • Smokeless tobacco: Never Used   Vaping Use   • Vaping Use: Never used   Substance and Sexual Activity   • Alcohol use: Never   • Drug use: Never   • Sexual activity: Defer       Review of Systems  Review of Systems   Respiratory: Positive for cough and shortness of breath.         Vital Signs  Temp:  [98 °F (36.7 °C)] 98 °F (36.7 °C)  Heart Rate:  [] 111  Resp:  [14-32] 17  BP: (111-137)/(62-80) 111/62    Physical Exam:  Physical Exam  Pulmonary:      Breath sounds: Rales present.           Radiology  Imaging Results (Last 24 Hours)     ** No results found for the last 24 hours. **          Labs:  Results from last 7 days   Lab Units 21  0856   WBC 10*3/mm3 5.00   HEMOGLOBIN g/dL 14.0   HEMATOCRIT % 42.9   PLATELETS 10*3/mm3 213     Results from last 7 days   Lab Units 21  0856   SODIUM mmol/L 140   POTASSIUM mmol/L 4.2   CHLORIDE mmol/L 104   CO2 mmol/L 27.4   BUN mg/dL 7*   CREATININE mg/dL 0.56*   CALCIUM mg/dL 8.8   GLUCOSE mg/dL 94                                       Meds:   SCHEDULE    Infusions  No current facility-administered medications for this encounter.    PRNs        I discussed the patients findings and my recommendations with patient.     Wellington Branham MD  21  16:55 EDT    Time: Critical care 70 min

## 2021-07-02 NOTE — ANESTHESIA POSTPROCEDURE EVALUATION
Patient: Liz Perla    Procedure Summary     Date: 07/02/21 Room / Location: [facility] ENDOSCOPY 2 / [facility] ENDOSCOPY    Anesthesia Start: 1134 Anesthesia Stop: 1223    Procedure: BRONCHOSCOPY WITH BRONCHOAVEOLAR LAVAGE ENDOBRONCHIAL ULTRASOUND with fine needle aspiration x2 areas (N/A Bronchus) Diagnosis:       Lung mass      (Lung mass [R91.8])    Surgeons: Wellington Branham MD Provider: Cosme Diaz MD    Anesthesia Type: general ASA Status: 3          Anesthesia Type: general    Vitals  Vitals Value Taken Time   BP 71/37 07/02/21 1246   Temp     Pulse 117 07/02/21 1249   Resp 22 07/02/21 1230   SpO2 95 % 07/02/21 1249   Vitals shown include unvalidated device data.        Post Anesthesia Care and Evaluation    Patient location during evaluation: PACU  Patient participation: complete - patient participated  Level of consciousness: awake  Pain scale: See nurse's notes for pain score.  Pain management: adequate  Airway patency: patent  Anesthetic complications: No anesthetic complications  PONV Status: none  Cardiovascular status: acceptable  Respiratory status: acceptable  Hydration status: acceptable    Comments: Patient seen and examined postoperatively; vital signs stable; SpO2 greater than or equal to 90%; cardiopulmonary status stable; nausea/vomiting adequately controlled; pain adequately controlled; no apparent anesthesia complications; patient discharged from anesthesia care when discharge criteria were met

## 2021-07-02 NOTE — NURSING NOTE
Patient oxygen saturation was low post procedure, Dr. Branham wanted to let the patient rest and return to baseline. This took longer than expected and caused a delay in PACU. Patient returned to baseline vitals and was okay to discharge per Dr. Branham

## 2021-07-02 NOTE — OP NOTE
Bronchoscopy Procedure Note    Procedure:  1. EBUS bronchoscopy, Diagnostic  2. Bronchoalveolar lavage, BAL right upper lobe  3. Ultrasound-guided Myrick Needle biopsy pretracheal node  4. Ultrasound-guided needle aspiration right hilar node    Pre-Operative Diagnosis: Mediastinal adenopathy    Post-Operative Diagnosis:   EBUS ultrasound-guided fine-needle aspiration from pretracheal node was positive for small cell cancer as well as right hilar needle aspirate    Anesthesia: General Anesthesia    Procedure Details: Patient was consented for the procedure with all risk and benefit of the procedure explained in detail.  Patient was given the opportunity to ask questions and all concerns were answered.  The bronchocope was inserted into the main airway via the endotracheal tube. An anatomical survey was done of the main airways and the subsegmental bronchus to at least the first subsegmental level of all five lobes of both lungs.  The findings are reported below.  A bronchoalveolar lavage was performed using aliquots of normal saline instilled into the airways then aspirated back.    Findings:  Bronchoscope passed into oral cavity to the level of the vocal cords.  Lidocaine used for local anesthetic over vocal cords.  Bronchoscope was passed between the vocal cords into the trachea.  All airways were visualized to at least the first subsegment level of all 5 lobes of both lungs.  Airways were of normal size and caliber.  No endobronchial lesions seen.      EBUS bronchoscopy  We located the pretracheal node and 3 passes where obtained all positive for small cell  We located right hilar node 10R under direct ultrasound guidance 3 passes were done utilizing size 19 gauge needle passes positive for small cell no bleeding was noted    Patient tolerated procedure well        Estimated Blood Loss:  Minimal           Specimens:  Sent serosanguinous fluid                Complications:  None; patient tolerated the procedure  well.           Disposition: PACU - hemodynamically stable.      Patient tolerated the procedure well.    Wellington Branham MD  7/2/2021  16:57 EDT

## 2021-07-02 NOTE — DISCHARGE INSTRUCTIONS
A responsible adult should stay with you and you should rest quietly for the rest of the day.    Do not drink alcohol, drive, operate any heavy machinery or power tools or make any legal/important decisions for the next 24 hours.     Progress your diet as tolerated.  If you begin to experience severe pain, increased shortness of breath, racing heartbeat or a fever above 101 F, seek immediate medical attention.     Follow up with MD as instructed. Call office for results in 3 to 5 days if needed.

## 2021-07-02 NOTE — ANESTHESIA PREPROCEDURE EVALUATION
Anesthesia Evaluation     NPO Solid Status: > 8 hours  NPO Liquid Status: > 8 hours           Airway   Dental      Pulmonary    Cardiovascular     (+) hypertension,       Neuro/Psych  GI/Hepatic/Renal/Endo      Musculoskeletal     Abdominal    Substance History      OB/GYN          Other        ROS/Med Hx Other: Hilar mass                  Anesthesia Plan    ASA 3     general     intravenous induction     Anesthetic plan, all risks, benefits, and alternatives have been provided, discussed and informed consent has been obtained with: patient.

## 2021-07-04 LAB
BACTERIA SPEC AEROBE CULT: NORMAL
GRAM STN SPEC: NORMAL
QT INTERVAL: 383 MS

## 2021-07-06 LAB
LAB AP CASE REPORT: NORMAL
LAB AP CASE REPORT: NORMAL
LAB AP DIAGNOSIS COMMENT: NORMAL
LAB AP DIAGNOSIS COMMENT: NORMAL
Lab: NORMAL
PATH REPORT.FINAL DX SPEC: NORMAL
PATH REPORT.FINAL DX SPEC: NORMAL
PATH REPORT.GROSS SPEC: NORMAL
PATH REPORT.GROSS SPEC: NORMAL

## 2021-07-08 LAB — SPECIMEN STATUS: NORMAL

## 2021-07-09 LAB
CMV DNA SPEC QL NAA+PROBE: NEGATIVE
P JIROVECII DNA # SPEC NAA+PROBE: NEGATIVE {COPIES}/ML
SPECIMEN SOURCE: NORMAL
SPECIMEN SOURCE: NORMAL

## 2021-07-12 LAB — VIRUS SPEC CULT: NORMAL

## 2021-07-13 ENCOUNTER — OFFICE VISIT (OUTPATIENT)
Dept: PULMONOLOGY | Facility: HOSPITAL | Age: 71
End: 2021-07-13

## 2021-07-13 VITALS
DIASTOLIC BLOOD PRESSURE: 72 MMHG | WEIGHT: 125.4 LBS | BODY MASS INDEX: 21.41 KG/M2 | RESPIRATION RATE: 15 BRPM | SYSTOLIC BLOOD PRESSURE: 115 MMHG | TEMPERATURE: 98.1 F | OXYGEN SATURATION: 93 % | HEART RATE: 77 BPM | HEIGHT: 64 IN

## 2021-07-13 DIAGNOSIS — C34.90 SMALL CELL LUNG CANCER (HCC): Primary | ICD-10-CM

## 2021-07-13 DIAGNOSIS — J43.8 OTHER EMPHYSEMA (HCC): ICD-10-CM

## 2021-07-13 PROCEDURE — G0463 HOSPITAL OUTPT CLINIC VISIT: HCPCS

## 2021-07-13 RX ORDER — PROCHLORPERAZINE MALEATE 10 MG
10 TABLET ORAL EVERY 6 HOURS PRN
COMMUNITY
Start: 2021-07-07

## 2021-07-13 RX ORDER — DEXAMETHASONE 4 MG/1
TABLET ORAL
COMMUNITY
Start: 2021-07-07

## 2021-07-13 NOTE — PROGRESS NOTES
"Subjective   Liz Perla is a 71 y.o. female.     History of Present Illness   F/u on lung mass, s/p bronch  Breathing ok  No cough  Inhalers only prn    Patient Active Problem List   Diagnosis   • HTN (hypertension)   • Hilar mass   • Lung mass     Current Outpatient Medications on File Prior to Visit   Medication Sig Dispense Refill   • albuterol sulfate  (90 Base) MCG/ACT inhaler Inhale 2 puffs.     • ascorbic acid (VITAMIN C) 250 MG tablet Take 250 mg by mouth Daily.     • Biotin 300 MCG tablet Take  by mouth.     • cholecalciferol (Cholecalciferol) 25 MCG (1000 UT) tablet Take 1,000 Units by mouth Daily.     • dexamethasone (DECADRON) 4 MG tablet TAKE ONE TABLET TWICE DAILY ON DAYS TWO, THREE, FOUR OF CHEMOTHERAPY WITH EACH CYCLE. TAKE WITH FOOD     • isosorbide mononitrate (IMDUR) 60 MG 24 hr tablet Take 60 mg by mouth Every Morning.     • prochlorperazine (COMPAZINE) 10 MG tablet Take 10 mg by mouth Every 6 (Six) Hours As Needed.     • SPIRULINA PO Take  by mouth.     • aspirin (Adult Aspirin EC Low Strength) 81 MG EC tablet ADULT ASPIRIN EC LOW STRENGTH 81 MG ORAL TABLET DELAYED RELEASE       No current facility-administered medications on file prior to visit.       The following portions of the patient's history were reviewed and updated as appropriate: allergies, past family history and past medical history.      Objective   Physical Exam  Blood pressure 115/72, pulse 77, temperature 98.1 °F (36.7 °C), temperature source Temporal, resp. rate 15, height 162.6 cm (64\"), weight 56.9 kg (125 lb 6.4 oz), SpO2 93 %.    Constitutional:  Well developed, well nourished, no acute distress, non-toxic appearance   Eyes:  PERRL, conjunctiva normal   HENT:  Atraumatic, external ears normal, nose normal, oropharynx moist, no pharyngeal exudates. mallampatti   Neck- normal range of motion, no tenderness, supple   Respiratory:  No respiratory distress, normal breath sounds, no rales, no wheezing "   Cardiovascular:  Normal rate, normal rhythm, no murmurs, no gallops, no rubs   GI:  Soft, nondistended, normal bowel sounds, nontender, no organomegaly, no mass, no rebound, no guarding   :  No costovertebral angle tenderness   Musculoskeletal:  No edema, no tenderness, no deformities. Back- no tenderness  Integument:  Well hydrated, no rash   Lymphatic:  No lymphadenopathy noted   Neurologic:  Alert & oriented x 3, CN 2-12 normal, normal motor function, normal sensory function, no focal deficits noted   Psychiatric:  Speech and behavior appropriate      CT Chest With Contrast Diagnostic     Result Date: 6/8/2021   1. Right hilar mass with contiguous extension into the mediastinum consistent with malignancy. Bulky adenopathy is seen within the mediastinum as described above. 2. Scattered Tribbett nodular densities in the right lower lobe, right middle lobe and lingula suggesting small airways infectious/inflammatory process. Report 3 no convincing evidence of metastatic disease in the included upper abdomen. Dedicated CT abdomen and pelvis with contrast or PET/CT is recommended for further evaluation or staging purposes. 3. Emphysema.    Electronically Signed By-Dona Suarez MD On:6/8/2021 12:54 PM This report was finalized on [id number] by  Dona Suarez MD.     CT Abdomen Pelvis With Contrast     Result Date: 6/8/2021   1. No acute findings in the abdomen or pelvis. 2. No evidence of primary malignancy or metastatic disease in the abdomen or pelvis. 3. CT chest performed on this same date has been reported separately.    Electronically Signed By-Dona Suarez MD On:6/8/2021 2:57 PM This report was finalized on [id number] by  Dona Suarez MD.        ASSESSMENT & PLAN:          71-year-old female quit smoking recently with history of 40 to 50 pack years smoking PET scan done on 6/18/2021   right hilar mass on image 54 measuring approximately 3.2 x 2.4 cm with SUV max 10.1, suggesting primary lung  malignancy. Scattered tiny tree-in-bud nodules within the right middle, right lower, and left upper lobe lingular regions likely represents infectious or inflammatory changes. There is FDG avid mediastinal lymphadenopathy compatible with metastasis. An index pretracheal lymph node on image 47 measures 3.3 x 2.7 cm with SUV max 10.3.   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

## 2021-07-30 LAB — FUNGUS WND CULT: NORMAL

## 2021-08-13 LAB
MYCOBACTERIUM SPEC CULT: NORMAL
NIGHT BLUE STAIN TISS: NORMAL

## 2023-09-14 ENCOUNTER — OFFICE VISIT (OUTPATIENT)
Dept: INFECTIOUS DISEASES | Facility: CLINIC | Age: 73
End: 2023-09-14
Payer: MEDICARE

## 2023-09-14 ENCOUNTER — LAB (OUTPATIENT)
Dept: LAB | Facility: HOSPITAL | Age: 73
End: 2023-09-14
Payer: MEDICARE

## 2023-09-14 VITALS
TEMPERATURE: 97.3 F | RESPIRATION RATE: 20 BRPM | DIASTOLIC BLOOD PRESSURE: 74 MMHG | HEART RATE: 82 BPM | BODY MASS INDEX: 21.28 KG/M2 | SYSTOLIC BLOOD PRESSURE: 120 MMHG | WEIGHT: 124 LBS

## 2023-09-14 DIAGNOSIS — J32.9 FUNGAL SINUSITIS: Primary | ICD-10-CM

## 2023-09-14 DIAGNOSIS — B49 FUNGAL SINUSITIS: ICD-10-CM

## 2023-09-14 DIAGNOSIS — B49 FUNGAL SINUSITIS: Primary | ICD-10-CM

## 2023-09-14 DIAGNOSIS — J32.9 FUNGAL SINUSITIS: ICD-10-CM

## 2023-09-14 LAB — CRYPTOC AG CSF QL: NEGATIVE

## 2023-09-14 PROCEDURE — 36415 COLL VENOUS BLD VENIPUNCTURE: CPT

## 2023-09-14 PROCEDURE — 87385 HISTOPLASMA CAPSUL AG IA: CPT

## 2023-09-14 PROCEDURE — 87899 AGENT NOS ASSAY W/OPTIC: CPT

## 2023-09-14 PROCEDURE — 87449 NOS EACH ORGANISM AG IA: CPT

## 2023-09-14 PROCEDURE — 87305 ASPERGILLUS AG IA: CPT

## 2023-09-14 RX ORDER — L.ACID,CASEI/B.ANIMAL/S.THERMO 16B CELL
1 CAPSULE ORAL DAILY
COMMUNITY

## 2023-09-14 RX ORDER — LEVOTHYROXINE SODIUM 88 UG/1
1 TABLET ORAL DAILY
COMMUNITY
Start: 2023-06-06

## 2023-09-14 NOTE — PROGRESS NOTES
"Chief Complaint  No chief complaint on file.    Subjective        Liz Perla presents to [facility] INFECTIOUS DISEASES  History of Present Illness    Patient is a 73-year-old female with past medical history of small cell lung cancer currently on atezolizumab with brain mets and resection via gamma knife who presents after routine imaging screening showing possible sinus fungal colonization.    Patient was recently screened with repeat imaging by her cancer providers in the setting of prior brain mets with MRI of the brain.  Imaging reported left maxillary sinus mucosal thickening with central collection concerning for fungal colonization without evidence of invasive fungal sinusitis.    Patient reports she has had difficulty off and on with congestion and cough.  States she was diagnosed earlier this year with \"fungal lung infection\".  States she received 5 days of therapy with improvement.  Recent lung imaging has been within normal limits.  Denies any fevers or chills.  States she has congestion moving down into her chest.  States she also has had some burning with urination recently.  Plans to go to urgent care today for evaluation of her urine due to needing prompt evaluation before her trip to [facility].  Reports prior exposure to caves.  Does work in the garden very often.    Objective   Vital Signs:  /74   Pulse 82   Temp 97.3 °F (36.3 °C)   Resp 20   Wt 56.2 kg (124 lb)   BMI 21.28 kg/m²   Estimated body mass index is 21.28 kg/m² as calculated from the following:    Height as of 7/13/21: 162.6 cm (64\").    Weight as of this encounter: 56.2 kg (124 lb).       Physical Exam  Constitutional:       General: She is not in acute distress.     Appearance: Normal appearance. She is normal weight. She is not ill-appearing.   HENT:      Head: Normocephalic and atraumatic.      Nose: Nose normal. No rhinorrhea.      Mouth/Throat:      Mouth: Mucous membranes are moist.      Pharynx: " No oropharyngeal exudate.   Eyes:      General: No scleral icterus.     Extraocular Movements: Extraocular movements intact.      Pupils: Pupils are equal, round, and reactive to light.   Cardiovascular:      Rate and Rhythm: Normal rate and regular rhythm.      Pulses: Normal pulses.   Pulmonary:      Effort: Pulmonary effort is normal. No respiratory distress.   Abdominal:      General: Abdomen is flat.      Palpations: Abdomen is soft.   Musculoskeletal:         General: No swelling or tenderness. Normal range of motion.      Cervical back: Normal range of motion and neck supple.      Right lower leg: No edema.      Left lower leg: No edema.   Skin:     General: Skin is warm and dry.      Findings: No rash.   Neurological:      General: No focal deficit present.      Mental Status: She is alert and oriented to person, place, and time.   Psychiatric:         Mood and Affect: Mood normal.         Behavior: Behavior normal.      Result Review :  The following data was reviewed by: Morris Camargo DO on 09/14/2023:    Data reviewed : Hematology/oncology notes, pulmonary notes, MRI brain, CT chest             Assessment and Plan   Diagnoses and all orders for this visit:    1. Fungal sinusitis (Primary)  -     Ambulatory Referral to ENT (Otolaryngology)  -     Histoplasma Ag Ur - Urine, Urine, Clean Catch; Future  -     Aspergillus Galactomannan Antigen - Blood, Arm, Right; Future  -     Fungitell B-D Glucan; Future  -     Blastomyces Antigen - Urine, Urine, Clean Catch; Future  -     Histoplasma Antigen ser; Future  -     Cryptococcal Antigen; Future    Reviewed the MRI findings with the patient today.  Read reports most consistent with fungal colonization and will place referral for further evaluation by ENT.  At this point can obtain labs for histoplasmosis, Aspergillus, Blastomyces, cryptococcus and Fungitell.  Unfortunately this is not exhaustive for all the possible fungal etiologies and direct sampling  would be the best mechanism.  Colonization in and of itself is not harmful and there is no current evidence on MRI of invasive disease.  We will follow-up with ENT's evaluation.       I spent 66 minutes caring for Liz on this date of service. This time includes time spent by me in the following activities:preparing for the visit, reviewing tests, obtaining and/or reviewing a separately obtained history, performing a medically appropriate examination and/or evaluation , counseling and educating the patient/family/caregiver, ordering medications, tests, or procedures, documenting information in the medical record, independently interpreting results and communicating that information with the patient/family/caregiver, and care coordination  Follow Up   No follow-ups on file.  Patient was given instructions and counseling regarding her condition or for health maintenance advice. Please see specific information pulled into the AVS if appropriate.

## 2023-09-16 LAB — 1,3 BETA GLUCAN SER-MCNC: <31 PG/ML

## 2023-09-18 LAB
BLASTOMYCES AG UR IA-MCNC: NORMAL NG/ML
SPECIMEN SOURCE: NORMAL

## 2023-09-20 LAB — GALACTOMANNAN AG SPEC IA-ACNC: 0.08 INDEX (ref 0–0.49)

## 2023-09-24 LAB — H CAPSUL AG SER QL IA: <0.5

## 2023-09-26 LAB — H CAPSUL AG UR QL IA: <0.5

## (undated) DEVICE — ADAPT SWVL FIBROPTIC BRONCH

## (undated) DEVICE — Device: Brand: BALLOON

## (undated) DEVICE — PRESSURE MONITORING ACCESSORY: Brand: TRUWAVE

## (undated) DEVICE — PRESSURE TUBING: Brand: TRUWAVE

## (undated) DEVICE — ST NDL BRONCH ASP VIZISHOT 2 FLX 19GA

## (undated) DEVICE — BAPTIST FLOYD BRONCHOSCOPY: Brand: MEDLINE INDUSTRIES, INC.